# Patient Record
Sex: FEMALE | Race: WHITE
[De-identification: names, ages, dates, MRNs, and addresses within clinical notes are randomized per-mention and may not be internally consistent; named-entity substitution may affect disease eponyms.]

---

## 2019-03-28 LAB
ALBUMIN SERPL-MCNC: 4.1 G/DL (ref 3.5–5)
ALBUMIN/GLOB SERPL: 1.2 {RATIO} (ref 0.8–2)
ALP SERPL-CCNC: 56 IU/L (ref 40–150)
ALT SERPL-CCNC: 15 IU/L (ref 0–55)
ANION GAP SERPL CALC-SCNC: 11.8 MMOL/L (ref 8–16)
BASOPHILS # BLD AUTO: 0 10*3/UL (ref 0–0.1)
BASOPHILS NFR BLD AUTO: 0.8 % (ref 0–1)
BUN SERPL-MCNC: 22 MG/DL (ref 7–26)
BUN/CREAT SERPL: 26 (ref 6–25)
CALCIUM SERPL-MCNC: 9.9 MG/DL (ref 8.4–10.2)
CHLORIDE SERPL-SCNC: 106 MMOL/L (ref 98–107)
CO2 SERPL-SCNC: 29 MMOL/L (ref 22–29)
DEPRECATED NEUTROPHILS # BLD AUTO: 3.4 10*3/UL (ref 2.1–6.9)
EGFRCR SERPLBLD CKD-EPI 2021: > 60 ML/MIN (ref 60–?)
EOSINOPHIL # BLD AUTO: 0.3 10*3/UL (ref 0–0.4)
EOSINOPHIL NFR BLD AUTO: 4.8 % (ref 0–6)
ERYTHROCYTE [DISTWIDTH] IN CORD BLOOD: 13 % (ref 11.7–14.4)
GLOBULIN PLAS-MCNC: 3.4 G/DL (ref 2.3–3.5)
GLUCOSE SERPLBLD-MCNC: 99 MG/DL (ref 74–118)
HCT VFR BLD AUTO: 34.9 % (ref 34.2–44.1)
HGB BLD-MCNC: 11.3 G/DL (ref 12–16)
LYMPHOCYTES # BLD: 1.1 10*3/UL (ref 1–3.2)
LYMPHOCYTES NFR BLD AUTO: 21.4 % (ref 18–39.1)
MCH RBC QN AUTO: 31.3 PG (ref 28–32)
MCHC RBC AUTO-ENTMCNC: 32.4 G/DL (ref 31–35)
MCV RBC AUTO: 96.7 FL (ref 81–99)
MONOCYTES # BLD AUTO: 0.4 10*3/UL (ref 0.2–0.8)
MONOCYTES NFR BLD AUTO: 7.1 % (ref 4.4–11.3)
NEUTS SEG NFR BLD AUTO: 65.7 % (ref 38.7–80)
PLATELET # BLD AUTO: 210 X10E3/UL (ref 140–360)
POTASSIUM SERPL-SCNC: 4.8 MMOL/L (ref 3.5–5.1)
RBC # BLD AUTO: 3.61 X10E6/UL (ref 3.6–5.1)
SODIUM SERPL-SCNC: 142 MMOL/L (ref 136–145)

## 2019-03-28 NOTE — DIAGNOSTIC IMAGING REPORT
EXAM: CHEST 2 VIEWS, PA and lateral

DATE: 3/28/2019 Time stamp on exam: 9:16 AM

INDICATION: Cough

COMPARISON: None



FINDINGS:

LINES/TUBES: None



LUNGS: No consolidations or edema. 



PLEURA: No effusions or pneumothorax.



HEART AND MEDIASTINUM: Normal size and contour. Minimal prominence of the

ascending aorta.



BONES AND SOFT TISSUES: No acute findings. 



IMPRESSION:

No acute thoracic abnormality.











Signed by: Dr. Kobe Blankenship DO on 3/28/2019 9:40 AM

## 2019-04-02 ENCOUNTER — HOSPITAL ENCOUNTER (OUTPATIENT)
Dept: HOSPITAL 88 - OR | Age: 59
Discharge: HOME | End: 2019-04-02
Attending: OBSTETRICS & GYNECOLOGY
Payer: COMMERCIAL

## 2019-04-02 VITALS — DIASTOLIC BLOOD PRESSURE: 80 MMHG | SYSTOLIC BLOOD PRESSURE: 119 MMHG

## 2019-04-02 DIAGNOSIS — E03.9: ICD-10-CM

## 2019-04-02 DIAGNOSIS — Z01.810: ICD-10-CM

## 2019-04-02 DIAGNOSIS — I10: ICD-10-CM

## 2019-04-02 DIAGNOSIS — Z01.818: ICD-10-CM

## 2019-04-02 DIAGNOSIS — N99.3: Primary | ICD-10-CM

## 2019-04-02 DIAGNOSIS — G47.33: ICD-10-CM

## 2019-04-02 DIAGNOSIS — Z01.812: ICD-10-CM

## 2019-04-02 PROCEDURE — 93005 ELECTROCARDIOGRAM TRACING: CPT

## 2019-04-02 PROCEDURE — 85025 COMPLETE CBC W/AUTO DIFF WBC: CPT

## 2019-04-02 PROCEDURE — 80053 COMPREHEN METABOLIC PANEL: CPT

## 2019-04-02 PROCEDURE — 71046 X-RAY EXAM CHEST 2 VIEWS: CPT

## 2019-04-02 PROCEDURE — 36415 COLL VENOUS BLD VENIPUNCTURE: CPT

## 2019-04-02 PROCEDURE — 57260 CMBN ANT PST COLPRHY: CPT

## 2019-04-02 PROCEDURE — 57282 COLPOPEXY EXTRAPERITONEAL: CPT

## 2019-04-02 NOTE — XMS REPORT
Summary of Care

                             Created on: 2018



DREW MCCABE

External Reference #: [id number]

: 1960

Sex: Female



Demographics







                          Address                   [address] [place] [address]  [phone]

 

                          Preferred Language        English

 

                          Marital Status            Unknown

 

                          Buddhism Affiliation     Unknown

 

                          Race                      Other Race

 

                          Ethnic Group              Non-





Author







                          Author                    MURALI TORRES M.D.

 

                          Organization              Unknown

 

                          Address                   [facility]



 

                          Phone                     Unavailable







Care Team Providers







                    Care Team Member Name    Role                Phone

 

                    MURALI TORRES M.D.    Unavailable         Unavailable

 

                    AGENT DUY HENNESSY    Unavailable         Unavailable

 

                          Unavailable               Unavailable







Functional Status







                    Name                Dates               Details

 

                                        Functional status health issues are not documented

                                                    Status: 









                    Name                Dates               Details

 

                                        Cognitive status health issues are not documented

                                                    Status: 







Problems







                    Name                Dates               Details

 

                                        Right knee pain (719.46, M25.561) 

                                                    Status: Active

 

                                        Patellofemoral pain syndrome of right knee (719.46, M22.2X1) 

                                                    Status: Active







Medications







                    Name                Dates               Details

 

                                        Medications not documented

                                           







Allergies and Adverse Reactions







                    Name                Dates               Details

 

                    Allergy history not documented                        Status: 









Procedures







                    Procedure           Dates               Details

 

                    Procedures not documented                         







Immunization







                    Name                Dates               Details

 

                                        Immunizations not documented

                                                     







Social History







                    Name                Dates               Details

 

                    Unknown if ever smoked                         







Vital Signs







                Date            Test            Result          Details

 

                                                                 

 

                    No Known Vitals to report                         







Results







                Date            Description     Value           Details

 

                    1-May-201813:06     [U] XRAY KNEE 4 OR MORE  VWS RIGHT [id number]      

 

                                        XR KNEE 4 OR MORE  VWS RIGHT    Images acquired, not reported on this accession

 number.   







Plan of Care







                    Name                Dates               Details

 

                                        Planned Observations 

 

                    Planned Goals not documented                         

 

                                        Planned Encounters 

 

                                        Appointment; MURALI TORRES M.D.

                                        On: 3-Jul-2018 9:45









Interventions Provided

Labs/Procedures/Imaging* [U] XRAY KNEE 4 OR MORE  VWS RIGHT [id number]; Done: 01 May 
  2018

Plan* Patient Education/Instructions:  

*  Patient Education Provided  

*  Reassurance  

*  Patient/Parent to call or return with any abnormal changes  

*  NSAIDS and ICE application for continued pain and swelling.  

*  Orders:  

*  Physical Therapy    

*  Medications:  

*  Medications (prescribed or recommended at this visit):  

*  - Nonsteroidal anti-inflammatory drugs prescribed  

*  Acetaminophen  

*  - Topical anti-inflammatory prescribed.  

*  Follow Up:  

*  Return to the clinic in 2 months or as needed.  

*  X-rays to be completed at next visit: No follow up Xrays required





Instructions







                    Name                Dates               Details

 

                                        Instructions not documented

                                                     







Encounters







                                        Appointment; MURALI TORRES M.D. 

Encounter Diagnosis: Problem not documented

                                        On: 1-May-2018 13:00

## 2019-04-02 NOTE — XMS REPORT
Patient Summary Document

                             Created on: 2019



DREW MCCABE

External Reference #: [id number]

: 1960

Sex: Female



Demographics







                          Address                   [address] [place] [address]

 

                          Home Phone                [phone]

 

                          Preferred Language        Unknown

 

                          Marital Status            Unknown

 

                          Latter-day Affiliation     Unknown

 

                          Race                      Unknown

 

                                        Additional Race(s)  

 

                          Ethnic Group              Unknown





Author







                          Author                    [facility]

 

                          Address                   Unknown

 

                          Phone                     Unavailable







Care Team Providers







                    Care Team Member Name    Role                Phone

 

                    PEGGY MARSHALL    Unavailable         Unavailable







Problems

This patient has no known problems.



Allergies, Adverse Reactions, Alerts

This patient has no known allergies or adverse reactions.



Medications

This patient has no known medications.



Results







           Test Description    Test Time    Test Comments    Text Results    Atomic Results    Result

 Comments

 

                CHEST 2 VIEWS    2019 09:38:00                                                             

                                             [place] [address]  
   Patient Name: DREW MCCABE                                   MR #: 
[id number]                     : 1960                                  
Age/Sex: 59/F  Acct #: [id number]                              Req #: [id number]
[id number]  Adm Physician:                                                      
Ordered by: JACINTO MARSHALL MD                            Report #: [id number]  
     Location: OR                                      Room/Bed:                
    
___________________________________________________________________________________________________
   Procedure: [id number] DX/CHEST 2 VIEWS  Exam Date: 19                   
        Exam Time: 920                                              REPORT 
STATUS: Signed    EXAM: CHEST 2 VIEWS, PA and lateral   DATE: 3/28/2019 Time st
amp on exam: 9:16 AM   INDICATION: Cough   COMPARISON: None      FINDINGS:   
LINES/TUBES: None      LUNGS: No consolidations or edema.       PLEURA: No 
effusions or pneumothorax.      HEART AND MEDIASTINUM: Normal size and contour. 
Minimal prominence of the   ascending aorta.      BONES AND SOFT TISSUES: No 
acute findings.       IMPRESSION:   No acute thoracic abnormality.              
   Signed by: Dr. Fadia Blankenship DO on 3/28/2019 9:40 AM        Dictated By: 
FADIA BLANKENSHIP DO  Electronically Signed By: FADIA BLANKENSHIP DO on 19  
Transcribed By: MORGAN on 19       COPY TO:   JACINTO MARSHALL MD

## 2019-04-19 NOTE — OPERATIVE REPORT
DATE OF PROCEDURE:  04/02/2019

 

SURGEON:  Juany Aparicio MD

 

ASSISTANT:  None.

 

PREOPERATIVE DIAGNOSIS:  Prolapse of vaginal vault after hysterectomy.

 

POSTOPERATIVE DIAGNOSIS:  Prolapse of vaginal vault after hysterectomy.

 

PROCEDURES PERFORMED:  Anterior and posterior colporrhaphy with sacrospinous colpopexy.

 

ANESTHESIA:  General.

 

ESTIMATED BLOOD LOSS:  50 mL.

 

COMPLICATIONS:  None.

 

FINDINGS:  A 4th degree apical prolapse, 4th degree cystocele, 2nd degree rectocele.

 

SPECIMENS:  None.

 

INDICATIONS:  The patient is a 59-year-old-female with history of prior hysterectomy

with subsequent vaginal vault prolapse. 

 

PROCEDURE NOTE:  The risks, benefits, indications, and alternatives of the procedure

were reviewed with the patient and informed consent was obtained.  The patient was taken

to the operating room, where general anesthesia was obtained.  She was then placed in

the dorsal lithotomy position using candy-cane stirrups, prepped and draped in typical

sterile fashion.  The lateral edges of the vaginal cuff was then held with Allis clamp

on tension while a solution of 0.5% Marcaine with epinephrine was injected just below

the vaginal mucosa throughout the area of the cystocele.  Several Allis clamps were

placed 3 to 4 cm apart up the midline of the anterior vaginal wall.  A transverse

incision was then made in the vaginal mucosa just superior to the vaginal cuff. 

 

The edges of the vaginal mucosa were held with the hemostat and the Metzenbaum scissors

were used to undermine the mucosa from the underlying fascia.  The mucosa was then

opened with the scissors in the midline to within 1 cm of the urethral meatus.  The

vagina was opened, edges of the mucosa were grasped with hemostats.  The fascia was then

 from the vaginal mucosa using both sharp and blunt dissection until the

bladder and urethra were  from the vaginal mucosa and clearly identified. 

 

A finger was then inserted through the incision in the vaginal mucosa and used to

bluntly dissect the rectovaginal space on the patient's right side.  The ischial spine

and sacrospinous ligaments were palpated and loose areolar tissue was bluntly dissected

off the ligament.  A zone approximately 1.5 cm medial to the ischial spine was selected

for insertion of the suture.  A [employer] SLIM Suture Capturing device was then loaded with

0 Vicryl suture and was introduced into the space and passed through the sacrospinous

ligament.  This entire procedure was then repeated on the patient's left sacrospinous

ligament.  The ends of the suture previously inserted through the sacrospinous ligaments

were then placed through the muscular layer of the vagina using the [employer] SLIM device. 

 

Attention was then returned to the anterior repair, where the bladder was reduced using

3-0 plain gut in a pursestring fashion in order to reduce the bladder allowing for

placement of plication sutures, 0-Vicryl sutures were thus placed in the

pubovesicocervical fascia starting approximately 1 cm below the urethral meatus.  All of

the remaining fascia was plicated in the midline with multiple interrupted 0-Vicryl

sutures until the entire cystocele had been reduced.  The edges of the vaginal mucosa

were then held on tension and the excessive vaginal mucosa was trimmed away using the

Young scissors.  The vaginal mucosa was then sutured in midline with continuous 0 Vicryl.

 The surgical sites were inspected and found to be hemostatic.  Attention was then

turned to the posterior repair where the apices of the posterior fourchette were grasped

with Allis clamps and a solution of 0.5% Marcaine with epinephrine was injected just

below the vaginal mucosa throughout the area of the rectocele.  A transverse incision

was then made with the scalpel at the level of the hymenal ring.  An additional Allis

clamp was placed in the midline at the top of the rectocele and 2 hemostats were placed

at the edges of the mucosa for retraction.  Metzenbaum scissors were then inserted under

the posterior vaginal mucosa dissecting the posterior mucosa off the rectovaginal

fascia.  A midline incision was then made in the mucosa.  This process was repeated

until the superior apex of the rectocele was reached. 

 

Interrupted sutures of 0-Vicryl were placed to reapproximate the superficial transverse

perinei muscle and levator ani muscle respectively.  The vagina was then closed over

this repair using running 2-0 Vicryl suture.  The bulbocavernosus was reapproximated in

the midline with the single interrupted 2-0 Vicryl suture.  The perineum was repaired

using 2-0 Vicryl in a subcuticular fashion. 

 

The vagina was copiously irrigated and noted to be hemostatic in all sutures sites.

Vaginal packing soaked in Premarin cream was placed within the vagina and all

instruments were 

removed.  The patient was awakened from general anesthesia and brought to the recovery

room in stable condition.  All sponge, lap, needle, and instrument counts were correct

x2. 

 

 

 

 

______________________________

MD JACQUIE Rider/FERNANDO

D:  04/18/2019 20:59:16

T:  04/19/2019 04:37:56

Job #:  [id number]